# Patient Record
Sex: FEMALE | Race: WHITE | Employment: FULL TIME | URBAN - METROPOLITAN AREA
[De-identification: names, ages, dates, MRNs, and addresses within clinical notes are randomized per-mention and may not be internally consistent; named-entity substitution may affect disease eponyms.]

---

## 2019-03-15 PROBLEM — R07.89 OTHER CHEST PAIN: Status: ACTIVE | Noted: 2019-03-15

## 2019-03-15 PROBLEM — E66.9 OBESITY: Status: ACTIVE | Noted: 2019-03-15

## 2019-06-17 PROBLEM — I83.811 VARICOSE VEINS OF RIGHT LOWER EXTREMITY WITH PAIN: Status: ACTIVE | Noted: 2019-06-17

## 2019-06-17 PROBLEM — M25.561 ACUTE PAIN OF RIGHT KNEE: Status: ACTIVE | Noted: 2019-06-17

## 2021-08-04 ENCOUNTER — HOSPITAL ENCOUNTER (OUTPATIENT)
Dept: CT IMAGING | Age: 51
Discharge: HOME | End: 2021-08-04
Attending: PHYSICIAN ASSISTANT
Payer: COMMERCIAL

## 2021-08-04 DIAGNOSIS — J34.89 SINUS PAIN: ICD-10-CM

## 2021-08-04 DIAGNOSIS — J33.9 NASAL POLYPS: ICD-10-CM

## 2021-08-04 DIAGNOSIS — Z98.890 HISTORY OF SINUS SURGERY: ICD-10-CM

## 2021-08-04 PROCEDURE — 70486 CT MAXILLOFACIAL W/O DYE: CPT

## 2022-03-18 PROBLEM — E66.9 OBESITY: Status: ACTIVE | Noted: 2019-03-15

## 2022-03-19 PROBLEM — M25.561 ACUTE PAIN OF RIGHT KNEE: Status: ACTIVE | Noted: 2019-06-17

## 2022-03-19 PROBLEM — I83.811 VARICOSE VEINS OF RIGHT LOWER EXTREMITY WITH PAIN: Status: ACTIVE | Noted: 2019-06-17

## 2022-03-20 PROBLEM — R07.89 OTHER CHEST PAIN: Status: ACTIVE | Noted: 2019-03-15

## 2023-12-14 ENCOUNTER — OFFICE VISIT (OUTPATIENT)
Dept: OCCUPATIONAL MEDICINE | Age: 53
End: 2023-12-14

## 2023-12-14 VITALS — OXYGEN SATURATION: 97 % | HEART RATE: 82 BPM | TEMPERATURE: 98.3 F

## 2023-12-14 DIAGNOSIS — J06.9 VIRAL UPPER RESPIRATORY TRACT INFECTION: Primary | ICD-10-CM

## 2023-12-14 DIAGNOSIS — H65.93 MIDDLE EAR EFFUSION, BILATERAL: ICD-10-CM

## 2023-12-14 LAB
EXP DATE SOLUTION: NORMAL
EXP DATE SWAB: NORMAL
EXPIRATION DATE: NORMAL
LOT NUMBER POC: NORMAL
LOT NUMBER SOLUTION: NORMAL
LOT NUMBER SWAB: NORMAL
SARS-COV-2 RNA, POC: NEGATIVE

## 2023-12-14 RX ORDER — PROGESTERONE 100 MG/1
100 CAPSULE ORAL DAILY
COMMUNITY
Start: 2023-12-10

## 2023-12-14 NOTE — PROGRESS NOTES
is alert and oriented to person, place, and time. Psychiatric:         Mood and Affect: Mood normal.         Behavior: Behavior normal.     ASSESSMENT and PLAN    Troy was seen today for otalgia. Diagnoses and all orders for this visit:    Viral upper respiratory tract infection  -     AMB POC COVID-19 COV    Middle ear effusion, bilateral    COVID negative. Sudafed per packaging instructions (no longer than 7 days) to help dry up middle ear effusion. Patient would benefit from Flonase (daily) due to history of frequent ear infections, eustachian tube dysfunction, and chronic sinusitis. RTC if symptoms worsen or fail to improve with treatment. Follow up with PCP for routine physical.    Counseled on benefits of having a primary care provider which includes, but is not limited to, continuity of care and having a medical home when concerns arise. Also enforced that onsite clinic policy states that we are not to take the place of a primary care provider, pt verbalized understanding. SEs and risk vs benefits associated with medications prescribed discussed with patient who verbalized understanding. Pt verbalized understanding and agreement with plan of care. RTC for persisting/worsening symptoms or new complaints that arise. Discussed signs and symptoms that would warrant immediate evaluation including, but not limited to HA, blurred vision, speech disturbance, difficulty with ambulation/gait, numbness, tingling, weakness, syncope, chest pain, or shortness of breath. I have reviewed the patient's medication list, past medical, family, social, and surgical history in detail and updated the patient record appropriately.     Misbah York, APRN - CNP

## 2024-02-23 ENCOUNTER — OFFICE VISIT (OUTPATIENT)
Dept: ENT CLINIC | Age: 54
End: 2024-02-23

## 2024-02-23 VITALS
BODY MASS INDEX: 30.13 KG/M2 | HEIGHT: 67 IN | SYSTOLIC BLOOD PRESSURE: 110 MMHG | DIASTOLIC BLOOD PRESSURE: 72 MMHG | WEIGHT: 192 LBS

## 2024-02-23 DIAGNOSIS — J32.0 CHRONIC MAXILLARY SINUSITIS: Primary | ICD-10-CM

## 2024-02-23 RX ORDER — AMOXICILLIN AND CLAVULANATE POTASSIUM 875; 125 MG/1; MG/1
1 TABLET, FILM COATED ORAL 2 TIMES DAILY
Qty: 20 TABLET | Refills: 0 | Status: SHIPPED | OUTPATIENT
Start: 2024-02-23 | End: 2024-03-04

## 2024-02-23 RX ORDER — METHYLPREDNISOLONE 4 MG/1
TABLET ORAL
Qty: 1 KIT | Refills: 0 | Status: SHIPPED | OUTPATIENT
Start: 2024-02-23 | End: 2024-02-29

## 2024-02-23 ASSESSMENT — ENCOUNTER SYMPTOMS
SHORTNESS OF BREATH: 0
ABDOMINAL PAIN: 0
SORE THROAT: 0

## 2024-02-23 NOTE — PROGRESS NOTES
Chief Complaint   Patient presents with    Sinus Problem     Patient states that back in December is when her sinus issues started back with the pain and pressure.        HPI:  Ginna Crystal is a 53 y.o. female seen in follow-up for her sinuses.  She initially underwent septoplasty and FESS back in July '16.  I had seen her again back in 2021 when she developed left-sided dental pain, facial pressure and thick drainage.  I took her back to the OR for revision left maxillary antrostomy with washout back on 8/26/2021.  She had been doing well until this past December when she developed a bad URI.  Since then, she has complained of L > R facial pain and pressure and worsening congestion.  She was initially treated with a round of antibiotics in the December, and the right side mostly cleared up.  She still reports pressure and pain across the left cheek.  There really has been minimal drainage so far.  No recent imaging studies.  She was also diagnosed with COVID a few weeks ago, but those symptoms have fully cleared.  \  Past Medical History, Past Surgical History, Family history, Social History, and Medications were all reviewed with the patient today and updated as necessary.     Allergies   Allergen Reactions    Bee Venom Shortness Of Breath     hasnt been stung since she was 24 years        Clindamycin Rash and Hives     Patient Active Problem List   Diagnosis    Obesity    Deviated septum    Otalgia    Chronic sinus infection    Acute pain of right knee    Varicose veins of right lower extremity with pain    Other chest pain    ETD (eustachian tube dysfunction)     Current Outpatient Medications   Medication Sig    amoxicillin-clavulanate (AUGMENTIN) 875-125 MG per tablet Take 1 tablet by mouth 2 times daily for 10 days    methylPREDNISolone (MEDROL DOSEPACK) 4 MG tablet Take by mouth.    progesterone (PROMETRIUM) 100 MG CAPS capsule Take 1 capsule by mouth daily    buPROPion (WELLBUTRIN XL) 300 MG extended release

## 2024-03-18 ENCOUNTER — TELEPHONE (OUTPATIENT)
Dept: ENT CLINIC | Age: 54
End: 2024-03-18

## 2024-03-20 ENCOUNTER — OFFICE VISIT (OUTPATIENT)
Dept: ENT CLINIC | Age: 54
End: 2024-03-20
Payer: COMMERCIAL

## 2024-03-20 VITALS
BODY MASS INDEX: 30.29 KG/M2 | HEIGHT: 67 IN | DIASTOLIC BLOOD PRESSURE: 72 MMHG | SYSTOLIC BLOOD PRESSURE: 112 MMHG | WEIGHT: 193 LBS

## 2024-03-20 DIAGNOSIS — J32.0 CHRONIC MAXILLARY SINUSITIS: Primary | ICD-10-CM

## 2024-03-20 PROCEDURE — 99213 OFFICE O/P EST LOW 20 MIN: CPT | Performed by: OTOLARYNGOLOGY

## 2024-03-20 RX ORDER — MOXIFLOXACIN HYDROCHLORIDE 400 MG/1
400 TABLET ORAL DAILY
Qty: 10 TABLET | Refills: 0 | Status: SHIPPED | OUTPATIENT
Start: 2024-03-20 | End: 2024-03-30

## 2024-03-20 ASSESSMENT — ENCOUNTER SYMPTOMS
SORE THROAT: 0
ABDOMINAL PAIN: 0
SHORTNESS OF BREATH: 0

## 2024-03-20 NOTE — PROGRESS NOTES
current facility-administered medications for this visit.     Past Medical History:   Diagnosis Date    Chronic sinus infection 10/3/2016    Deviated septum 10/3/2016    ETD (eustachian tube dysfunction) 10/3/2016    Otalgia     Sinus pain      Social History     Tobacco Use    Smoking status: Never    Smokeless tobacco: Never   Substance Use Topics    Alcohol use: Yes     Past Surgical History:   Procedure Laterality Date    GI  11/2018    bariatric surgery     HEENT Left 08/26/2021    revision FESS w/ L max antrostomy- Mack    KNEE ARTHROSCOPY      SEPTOPLASTY  07/14/2016    FESS     Family History   Problem Relation Age of Onset    Cancer Mother         Lung    Breast Cancer Mother         ROS:    Review of Systems   Constitutional:  Negative for fever.   HENT:  Negative for ear pain and sore throat.    Eyes:  Negative for visual disturbance.   Respiratory:  Negative for shortness of breath.    Cardiovascular:  Negative for chest pain.   Gastrointestinal:  Negative for abdominal pain.   Skin:  Negative for rash.   Neurological:  Negative for dizziness and headaches.   Hematological:  Negative for adenopathy.   Psychiatric/Behavioral:  Negative for agitation.         PHYSICAL EXAM:    /72 (Site: Left Upper Arm, Position: Sitting)   Ht 1.702 m (5' 7\")   Wt 87.5 kg (193 lb)   BMI 30.23 kg/m²     General: NAD, well-appearing  Neuro: No gross neuro deficits. No facial weakness.  Eyes: No periorbital edema/ecchymosis. No nystagmus.  Skin: No facial erythema, rashes or concerning lesions.  Nose: No external deviations or saddling. Intranasally, septum is midline without perforations, changes consistent with previous surgery-no further purulence noted within middle meatus.  No polyps at all.  Mouth: Moist mucus membranes, normal tongue/palate mobility, no concerning mucosal lesions. Oropharynx clear with no erythema/exudate, no tonsillar hypertrophy.  Ears: Normal appearing auricles, no hematomas. EACs clear

## 2024-03-27 ENCOUNTER — HOSPITAL ENCOUNTER (OUTPATIENT)
Dept: CT IMAGING | Age: 54
Discharge: HOME | End: 2024-03-30
Attending: OTOLARYNGOLOGY
Payer: COMMERCIAL

## 2024-03-27 DIAGNOSIS — J32.0 CHRONIC MAXILLARY SINUSITIS: ICD-10-CM

## 2024-03-27 PROCEDURE — 70486 CT MAXILLOFACIAL W/O DYE: CPT

## 2024-04-02 ENCOUNTER — OFFICE VISIT (OUTPATIENT)
Dept: ENT CLINIC | Age: 54
End: 2024-04-02
Payer: COMMERCIAL

## 2024-04-02 VITALS — RESPIRATION RATE: 18 BRPM | BODY MASS INDEX: 29.19 KG/M2 | HEIGHT: 67 IN | WEIGHT: 186 LBS

## 2024-04-02 DIAGNOSIS — J32.0 CHRONIC MAXILLARY SINUSITIS: Primary | ICD-10-CM

## 2024-04-02 PROCEDURE — 99213 OFFICE O/P EST LOW 20 MIN: CPT | Performed by: OTOLARYNGOLOGY

## 2024-04-02 ASSESSMENT — ENCOUNTER SYMPTOMS
SHORTNESS OF BREATH: 0
SORE THROAT: 0
ABDOMINAL PAIN: 0

## 2024-04-02 NOTE — PROGRESS NOTES
Chief Complaint   Patient presents with    Follow-up     Patient is here for her ct review.        HPI:  Ginna Crystal is a 53 y.o. female seen in follow-up for her sinuses. She initially underwent septoplasty and FESS back in July '16.  I had seen her again back in 2021 when she developed left-sided dental pain, facial pressure and thick drainage.  I took her back to the OR for revision left maxillary antrostomy with washout back on 8/26/2021.  I saw her earlier this winter for worsening left-sided facial pressure and congestion.  She has been on 3 rounds of antibiotics and 2 rounds of steroids since then- most recently 10 days of Avelox at her last visit.  She continues to complain of left-sided facial pain and even a warm sensation along the cheek intermittently.  There is thick nasal drainage, especially in the mornings and evenings.  She denies any significant nasal congestion.  She continues to irrigate daily as instructed.  I had worked her up with a CT scan recently due to the persistence of her symptoms.    Past Medical History, Past Surgical History, Family history, Social History, and Medications were all reviewed with the patient today and updated as necessary.     Allergies   Allergen Reactions    Bee Venom Shortness Of Breath     hasnt been stung since she was 24 years        Clindamycin Rash and Hives     Patient Active Problem List   Diagnosis    Obesity    Deviated septum    Otalgia    Chronic sinus infection    Acute pain of right knee    Varicose veins of right lower extremity with pain    Other chest pain    ETD (eustachian tube dysfunction)     Current Outpatient Medications   Medication Sig    progesterone (PROMETRIUM) 100 MG CAPS capsule Take 1 capsule by mouth daily    buPROPion (WELLBUTRIN XL) 300 MG extended release tablet Take 1 tablet by mouth daily    vitamin D (CHOLECALCIFEROL) 46364 UNIT CAPS TK 1 C PO ONCE A WEEK    estradiol (ESTRACE) 1 MG tablet Take 1 tablet by mouth     No

## 2024-04-11 DIAGNOSIS — G89.18 POST-OP PAIN: Primary | ICD-10-CM

## 2024-04-11 RX ORDER — HYDROCODONE BITARTRATE AND ACETAMINOPHEN 5; 325 MG/1; MG/1
1 TABLET ORAL EVERY 4 HOURS PRN
Qty: 30 TABLET | Refills: 0 | Status: SHIPPED | OUTPATIENT
Start: 2024-04-11 | End: 2024-04-16

## 2024-04-11 RX ORDER — CEPHALEXIN 500 MG/1
500 CAPSULE ORAL 4 TIMES DAILY
Qty: 28 CAPSULE | Refills: 0 | Status: SHIPPED | OUTPATIENT
Start: 2024-04-11

## 2024-04-11 RX ORDER — ONDANSETRON 4 MG/1
4 TABLET, ORALLY DISINTEGRATING ORAL 3 TIMES DAILY PRN
Qty: 10 TABLET | Refills: 0 | Status: SHIPPED | OUTPATIENT
Start: 2024-04-11

## 2024-04-11 NOTE — TELEPHONE ENCOUNTER
I called and left a detailed message for the pt about her medication and the pharmacy we sent the medication too.

## 2024-04-18 ENCOUNTER — OUTSIDE SERVICES (OUTPATIENT)
Dept: ENT CLINIC | Age: 54
End: 2024-04-18
Payer: COMMERCIAL

## 2024-04-18 DIAGNOSIS — J32.0 CHRONIC MAXILLARY SINUSITIS: Primary | ICD-10-CM

## 2024-04-18 PROCEDURE — 31267 ENDOSCOPY MAXILLARY SINUS: CPT | Performed by: OTOLARYNGOLOGY

## 2024-04-18 NOTE — PROGRESS NOTES
[facility] Operative Note    Patient: Ginna Crystal-[id number]    Pre-op diagnosis: Chronic maxillary sinusitis    Post-op diagnosis: Chronic maxillary sinusitis    Procedure: Revision functional endoscopic sinus surgery with bilateral maxillary antrostomies with tissue removal    Operative Surgeon: Victor M Mack MD    Anesthesia: General endotracheal    Anesthesiologist: Cristhian Dillard MD    Operative findings: There was thick purulence filling the left maxillary sinus.  There was hyperplastic mucosa within the right maxillary sinus but no acute purulence.  The rest of her paranasal sinuses were all clear.  NasoPore was placed within both middle meatuses.    IV fluid: 700 cc crystalloid    Estimated blood loss: 10 cc    Drains: None    Specimens: None    Complications: None    Disposition: PACU then home    Condition: Stable    Brief history: Ginna is a 53-year-old female with a long history of chronic sinusitis.  She underwent sinus surgery initially back in 2016 and then underwent revision on the left side back in 2021.  She presented earlier this winter with worsening left-sided facial pressure, congestion, and drainage.  She was treated with several rounds of oral antibiotics and steroids with minimal relief.  CT scan was obtained which revealed continued inflammation within both maxillary sinuses.  The decision was made to take her to the operating room for revision functional endoscopic sinus surgery with maxillary sinus washout.    Description of procedure: The patient was brought back to the operating room and placed on the table in the supine position.  General endotracheal anesthesia was inducted without any complications.  Once the patient was adequate sedated, nasal pledgets soaked in Afrin were placed on both nasal cavities for topical decongestion.  She was then sterilely prepped and draped in usual fashion.      I began by removing the nasal pledgets and using a 0 degree scope to visualize both

## 2024-04-26 ENCOUNTER — OFFICE VISIT (OUTPATIENT)
Dept: ENT CLINIC | Age: 54
End: 2024-04-26

## 2024-04-26 DIAGNOSIS — J32.0 CHRONIC MAXILLARY SINUSITIS: Primary | ICD-10-CM

## 2024-04-26 ASSESSMENT — ENCOUNTER SYMPTOMS
ABDOMINAL PAIN: 0
SHORTNESS OF BREATH: 0
SORE THROAT: 0

## 2024-04-26 NOTE — PROGRESS NOTES
Chief Complaint   Patient presents with    Post-Op Check       HPI:  Ginna Crystal is a 53 y.o. female seen in follow-up now 1 week postop after undergoing revision FESS with bilateral maxillary antrostomies.  Doing well overall with less facial pressure on the left side.  She still reports some congestion.  No further bleeding.  Pain has been well-controlled.  She has continued irrigating daily with salt water.      Past Medical History, Past Surgical History, Family history, Social History, and Medications were all reviewed with the patient today and updated as necessary.     Allergies   Allergen Reactions    Bee Venom Shortness Of Breath     hasnt been stung since she was 24 years        Clindamycin Rash and Hives     Patient Active Problem List   Diagnosis    Obesity    Deviated septum    Otalgia    Chronic sinus infection    Acute pain of right knee    Varicose veins of right lower extremity with pain    Other chest pain    ETD (eustachian tube dysfunction)     Current Outpatient Medications   Medication Sig    cephALEXin (KEFLEX) 500 MG capsule Take 1 capsule by mouth 4 times daily    ondansetron (ZOFRAN-ODT) 4 MG disintegrating tablet Take 1 tablet by mouth 3 times daily as needed for Nausea or Vomiting    progesterone (PROMETRIUM) 100 MG CAPS capsule Take 1 capsule by mouth daily    buPROPion (WELLBUTRIN XL) 300 MG extended release tablet Take 1 tablet by mouth daily    vitamin D (CHOLECALCIFEROL) 22331 UNIT CAPS TK 1 C PO ONCE A WEEK    estradiol (ESTRACE) 1 MG tablet Take 1 tablet by mouth     No current facility-administered medications for this visit.     Past Medical History:   Diagnosis Date    Chronic sinus infection 10/3/2016    Deviated septum 10/3/2016    ETD (eustachian tube dysfunction) 10/3/2016    Otalgia     Sinus pain      Social History     Tobacco Use    Smoking status: Never    Smokeless tobacco: Never   Substance Use Topics    Alcohol use: Yes     Past Surgical History:   Procedure

## 2024-05-09 ENCOUNTER — OFFICE VISIT (OUTPATIENT)
Age: 54
End: 2024-05-09

## 2024-05-09 VITALS — TEMPERATURE: 98.7 F | HEART RATE: 96 BPM | RESPIRATION RATE: 16 BRPM | OXYGEN SATURATION: 98 %

## 2024-05-09 DIAGNOSIS — R68.89 FLU-LIKE SYMPTOMS: ICD-10-CM

## 2024-05-09 DIAGNOSIS — J06.9 VIRAL UPPER RESPIRATORY TRACT INFECTION: Primary | ICD-10-CM

## 2024-05-09 DIAGNOSIS — R05.1 ACUTE COUGH: ICD-10-CM

## 2024-05-09 LAB
EXP DATE SOLUTION: NORMAL
EXP DATE SWAB: NORMAL
EXPIRATION DATE: NORMAL
LOT NUMBER POC: NORMAL
LOT NUMBER SOLUTION: NORMAL
LOT NUMBER SWAB: NORMAL
QUICKVUE INFLUENZA TEST: NEGATIVE
SARS-COV-2 RNA, POC: NEGATIVE
VALID INTERNAL CONTROL, POC: YES

## 2024-05-09 RX ORDER — BENZONATATE 100 MG/1
CAPSULE ORAL
Qty: 30 CAPSULE | Refills: 0 | Status: SHIPPED | OUTPATIENT
Start: 2024-05-09

## 2024-05-09 ASSESSMENT — ENCOUNTER SYMPTOMS
SINUS PAIN: 0
NAUSEA: 0
DIARRHEA: 0
RHINORRHEA: 1
SORE THROAT: 0
WHEEZING: 1
VOMITING: 0
ABDOMINAL PAIN: 0
COUGH: 1
SWOLLEN GLANDS: 0

## 2024-05-09 NOTE — PROGRESS NOTES
Cough is dry   Cardiovascular:  Negative for chest pain.   Gastrointestinal:  Negative for abdominal pain, diarrhea, nausea and vomiting.   Endocrine: Negative.    Genitourinary:  Negative for dysuria.   Musculoskeletal:  Positive for arthralgias and myalgias. Negative for joint pain and neck pain.   Skin:  Negative for rash.   Allergic/Immunologic: Negative.    Neurological:  Negative for dizziness and headaches.   Hematological: Negative.    Psychiatric/Behavioral: Negative.       OBJECTIVE:    Pulse 96   Temp 98.7 °F (37.1 °C) (Oral)   Resp 16   SpO2 98%      Results for orders placed or performed in visit on 05/09/24   AMB POC COVID-19 COV   Result Value Ref Range    SARS-COV-2 RNA, POC Negative     Lot number swab      EXP date swab      Lot number solution      EXP date solution      LOT NUMBER POC      EXPIRATION DATE     AMB POC RAPID INFLUENZA TEST   Result Value Ref Range    Valid Internal Control, POC YES     QuickVue Influenza test Negative      Physical Exam  Constitutional:       General: She is not in acute distress.     Appearance: Normal appearance. She is not ill-appearing, toxic-appearing or diaphoretic.   HENT:      Head: Normocephalic and atraumatic.      Right Ear: Tympanic membrane, ear canal and external ear normal.      Left Ear: Tympanic membrane, ear canal and external ear normal.      Nose: Congestion and rhinorrhea present.      Mouth/Throat:      Mouth: Mucous membranes are moist.      Pharynx: Oropharynx is clear. Posterior oropharyngeal erythema present. No oropharyngeal exudate.   Eyes:      Conjunctiva/sclera: Conjunctivae normal.   Cardiovascular:      Rate and Rhythm: Normal rate and regular rhythm.      Pulses: Normal pulses.      Heart sounds: Normal heart sounds.   Pulmonary:      Effort: Pulmonary effort is normal.      Breath sounds: Normal breath sounds.   Musculoskeletal:      Cervical back: Neck supple.   Skin:     General: Skin is warm and dry.   Neurological:

## 2024-05-10 ASSESSMENT — ENCOUNTER SYMPTOMS
SINUS PRESSURE: 0
CHEST TIGHTNESS: 0
CHOKING: 0
EYES NEGATIVE: 1
APNEA: 0
ALLERGIC/IMMUNOLOGIC NEGATIVE: 1
STRIDOR: 0
SHORTNESS OF BREATH: 0

## 2025-02-13 ENCOUNTER — OFFICE VISIT (OUTPATIENT)
Dept: ENT CLINIC | Age: 55
End: 2025-02-13
Payer: COMMERCIAL

## 2025-02-13 VITALS — BODY MASS INDEX: 29.13 KG/M2 | HEIGHT: 67 IN

## 2025-02-13 DIAGNOSIS — J32.0 CHRONIC MAXILLARY SINUSITIS: ICD-10-CM

## 2025-02-13 DIAGNOSIS — M26.609 TMJ (TEMPOROMANDIBULAR JOINT DISORDER): Primary | ICD-10-CM

## 2025-02-13 PROCEDURE — 99213 OFFICE O/P EST LOW 20 MIN: CPT | Performed by: PHYSICIAN ASSISTANT

## 2025-02-13 RX ORDER — MULTIVITAMIN,THERAPEUTIC
1 TABLET ORAL DAILY
COMMUNITY

## 2025-02-13 RX ORDER — CYCLOBENZAPRINE HCL 5 MG
5 TABLET ORAL 2 TIMES DAILY PRN
Qty: 30 TABLET | Refills: 0 | Status: SHIPPED | OUTPATIENT
Start: 2025-02-13 | End: 2025-03-15

## 2025-02-13 ASSESSMENT — ENCOUNTER SYMPTOMS
RESPIRATORY NEGATIVE: 1
FACIAL SWELLING: 1
ALLERGIC/IMMUNOLOGIC NEGATIVE: 1
EYES NEGATIVE: 1
GASTROINTESTINAL NEGATIVE: 1

## 2025-02-13 NOTE — PROGRESS NOTES
polyps or abnormal drainage.    Lips/Gums/Teeth: Inspection of lips, gums and teeth are normal  Oral Cavity: normal oral mucosa, no visualized ulcers, masses or other lesions,  Palate normal, Tongue normal, Floor of mouth normal. Mallampati Class 1 .  Oropharynx: Oropharynx normal and unobstructed, tonsils are  + 2  , no lesion or inflammation.     Neck/Thyroid: Normal appearance without mass, Trachea midline, No lymphadenopathy, No enlargement, tenderness or mass of thyroid noted.      Procedure: N/A          ICD-10-CM    1. TMJ (temporomandibular joint disorder)  M26.609       2. Chronic maxillary sinusitis  J32.0          Assessment & Plan  1. Temporomandibular joint (TMJ) disorder.  The patient's symptoms are consistent with TMJ disorder, likely due to inflammation of the muscles controlling the jaw joint. There is no evidence of infection or stone in the parotid gland. She is advised to apply warm compresses and perform targeted massages to alleviate discomfort. A prescription for Flexeril 10 mg has been issued to her pharmacy, with a month's supply provided. She is also recommended to take NSAIDs such as ibuprofen or Motrin for pain management. If symptoms persist beyond a week, further evaluation will be necessary.      The patient (or guardian, if applicable) and other individuals in attendance with the patient were advised that Artificial Intelligence will be utilized during this visit to record, process the conversation to generate a clinical note, and support improvement of the AI technology. The patient (or guardian, if applicable) and other individuals in attendance at the appointment consented to the use of AI, including the recording.            KVNG Morales

## 2025-03-20 RX ORDER — CYCLOBENZAPRINE HCL 5 MG
5 TABLET ORAL 2 TIMES DAILY PRN
Qty: 30 TABLET | Refills: 0 | Status: SHIPPED | OUTPATIENT
Start: 2025-03-20 | End: 2025-04-19